# Patient Record
Sex: MALE | URBAN - METROPOLITAN AREA
[De-identification: names, ages, dates, MRNs, and addresses within clinical notes are randomized per-mention and may not be internally consistent; named-entity substitution may affect disease eponyms.]

---

## 2019-07-26 ENCOUNTER — HOSPITAL ENCOUNTER (EMERGENCY)
Age: 49
Discharge: LEFT BEFORE DISCHARGE | End: 2019-07-26
Attending: EMERGENCY MEDICINE

## 2019-07-26 VITALS
OXYGEN SATURATION: 100 % | DIASTOLIC BLOOD PRESSURE: 79 MMHG | WEIGHT: 240 LBS | SYSTOLIC BLOOD PRESSURE: 166 MMHG | TEMPERATURE: 99.3 F | BODY MASS INDEX: 32.51 KG/M2 | HEART RATE: 114 BPM | HEIGHT: 72 IN | RESPIRATION RATE: 20 BRPM

## 2019-07-26 DIAGNOSIS — Z53.21 PATIENT LEFT WITHOUT BEING SEEN: Primary | ICD-10-CM

## 2019-07-26 PROCEDURE — 75810000275 HC EMERGENCY DEPT VISIT NO LEVEL OF CARE

## 2019-07-26 NOTE — ED TRIAGE NOTES
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 Patient was sent here by methadone clinic for evaluation of hand swelling.

## 2019-07-26 NOTE — ED NOTES
Patient has not returned to room after asking staff to go out to make a phone call. Pt noted to be under the influence of an unknown substance.

## 2019-07-26 NOTE — ED NOTES
Patient in room for approx 1 minute before asking him he can go to his car to get his cell phone prior to being evaluated by dr. Lina Childers